# Patient Record
Sex: FEMALE | Race: WHITE | Employment: OTHER | URBAN - METROPOLITAN AREA
[De-identification: names, ages, dates, MRNs, and addresses within clinical notes are randomized per-mention and may not be internally consistent; named-entity substitution may affect disease eponyms.]

---

## 2017-04-10 PROBLEM — Z71.89 COUNSELING ON SUBSTANCE USE AND ABUSE: Status: ACTIVE | Noted: 2017-04-10

## 2017-04-10 PROBLEM — B00.9 HERPES SIMPLEX: Status: ACTIVE | Noted: 2017-04-10

## 2017-04-10 PROBLEM — Z23 NEED FOR PROPHYLACTIC VACCINATION AND INOCULATION AGAINST INFLUENZA: Status: ACTIVE | Noted: 2017-04-10

## 2017-04-10 PROBLEM — R51 HEADACHE: Status: ACTIVE | Noted: 2017-04-10

## 2017-04-10 PROBLEM — J32.9 CHRONIC SINUSITIS: Status: ACTIVE | Noted: 2017-04-10

## 2017-04-10 PROBLEM — Z76.0 ENCOUNTER FOR ISSUE OF REPEAT PRESCRIPTION: Status: ACTIVE | Noted: 2017-04-10

## 2017-04-10 PROBLEM — L70.8 OTHER ACNE: Status: ACTIVE | Noted: 2017-04-10

## 2017-04-10 PROBLEM — Z00.00 ROUTINE GENERAL MEDICAL EXAMINATION AT A HEALTH CARE FACILITY: Status: ACTIVE | Noted: 2017-04-10

## 2017-06-20 PROBLEM — Z76.0 ENCOUNTER FOR ISSUE OF REPEAT PRESCRIPTION: Status: RESOLVED | Noted: 2017-04-10 | Resolved: 2017-06-20

## 2017-06-20 PROBLEM — Z23 NEED FOR PROPHYLACTIC VACCINATION AND INOCULATION AGAINST INFLUENZA: Status: RESOLVED | Noted: 2017-04-10 | Resolved: 2017-06-20

## 2017-06-20 PROBLEM — Z00.00 ROUTINE GENERAL MEDICAL EXAMINATION AT A HEALTH CARE FACILITY: Status: RESOLVED | Noted: 2017-04-10 | Resolved: 2017-06-20

## 2017-06-20 PROBLEM — Z71.89 COUNSELING ON SUBSTANCE USE AND ABUSE: Status: RESOLVED | Noted: 2017-04-10 | Resolved: 2017-06-20

## 2017-10-17 PROCEDURE — 87101 SKIN FUNGI CULTURE: CPT | Performed by: FAMILY MEDICINE

## 2017-10-17 PROCEDURE — 87077 CULTURE AEROBIC IDENTIFY: CPT | Performed by: FAMILY MEDICINE

## 2021-04-08 PROBLEM — I10 ESSENTIAL HYPERTENSION: Status: ACTIVE | Noted: 2021-04-08

## 2021-04-08 PROBLEM — G43.909 MIGRAINE HEADACHE: Status: ACTIVE | Noted: 2017-04-10

## 2023-05-10 RX ORDER — GABAPENTIN 100 MG/1
100 CAPSULE ORAL DAILY PRN
COMMUNITY
Start: 2023-02-23

## 2023-05-10 RX ORDER — CYCLOBENZAPRINE HCL 10 MG
10 TABLET ORAL EVERY 6 HOURS PRN
COMMUNITY
Start: 2023-02-22

## 2023-05-10 RX ORDER — BUTALBITAL, ACETAMINOPHEN AND CAFFEINE 50; 325; 40 MG/1; MG/1; MG/1
1 TABLET ORAL EVERY 4 HOURS PRN
COMMUNITY

## 2023-05-11 ENCOUNTER — HOSPITAL ENCOUNTER (OUTPATIENT)
Facility: HOSPITAL | Age: 63
Setting detail: HOSPITAL OUTPATIENT SURGERY
Discharge: HOME | End: 2023-05-11
Attending: INTERNAL MEDICINE | Admitting: INTERNAL MEDICINE
Payer: COMMERCIAL

## 2023-05-11 ENCOUNTER — ANESTHESIA EVENT (OUTPATIENT)
Dept: ENDOSCOPY | Facility: HOSPITAL | Age: 63
End: 2023-05-11
Payer: COMMERCIAL

## 2023-05-11 ENCOUNTER — ANESTHESIA (OUTPATIENT)
Dept: ENDOSCOPY | Facility: HOSPITAL | Age: 63
End: 2023-05-11
Payer: COMMERCIAL

## 2023-05-11 VITALS
WEIGHT: 110 LBS | OXYGEN SATURATION: 100 % | TEMPERATURE: 99 F | SYSTOLIC BLOOD PRESSURE: 155 MMHG | HEART RATE: 90 BPM | BODY MASS INDEX: 19.49 KG/M2 | RESPIRATION RATE: 16 BRPM | DIASTOLIC BLOOD PRESSURE: 84 MMHG | HEIGHT: 63 IN

## 2023-05-11 PROCEDURE — 0DB68ZX EXCISION OF STOMACH, VIA NATURAL OR ARTIFICIAL OPENING ENDOSCOPIC, DIAGNOSTIC: ICD-10-PCS | Performed by: INTERNAL MEDICINE

## 2023-05-11 PROCEDURE — 88305 TISSUE EXAM BY PATHOLOGIST: CPT | Performed by: INTERNAL MEDICINE

## 2023-05-11 PROCEDURE — 0DBK8ZX EXCISION OF ASCENDING COLON, VIA NATURAL OR ARTIFICIAL OPENING ENDOSCOPIC, DIAGNOSTIC: ICD-10-PCS | Performed by: INTERNAL MEDICINE

## 2023-05-11 RX ORDER — LIDOCAINE HYDROCHLORIDE 10 MG/ML
INJECTION, SOLUTION EPIDURAL; INFILTRATION; INTRACAUDAL; PERINEURAL AS NEEDED
Status: DISCONTINUED | OUTPATIENT
Start: 2023-05-11 | End: 2023-05-11 | Stop reason: SURG

## 2023-05-11 RX ORDER — SODIUM CHLORIDE, SODIUM LACTATE, POTASSIUM CHLORIDE, CALCIUM CHLORIDE 600; 310; 30; 20 MG/100ML; MG/100ML; MG/100ML; MG/100ML
INJECTION, SOLUTION INTRAVENOUS CONTINUOUS
Status: DISCONTINUED | OUTPATIENT
Start: 2023-05-11 | End: 2023-05-11

## 2023-05-11 RX ADMIN — LIDOCAINE HYDROCHLORIDE 50 MG: 10 INJECTION, SOLUTION EPIDURAL; INFILTRATION; INTRACAUDAL; PERINEURAL at 14:36:00

## 2023-05-11 RX ADMIN — SODIUM CHLORIDE, SODIUM LACTATE, POTASSIUM CHLORIDE, CALCIUM CHLORIDE: 600; 310; 30; 20 INJECTION, SOLUTION INTRAVENOUS at 14:49:00

## 2023-05-11 NOTE — ANESTHESIA POSTPROCEDURE EVALUATION
[facility] JASWINDER Patel Patient Status:  Hospital Outpatient Surgery   Age/Gender 58year old female MRN [id number]   Location [address] [address] Attending Tl No MD   Hosp Day # 0 PCP Faraz Gould MD       Anesthesia Post-op Note    ESOPHAGOGASTRODUODENOSCOPY (EGD) with biopsies, and  COLONOSCOPY with cold snare polypectomy    Procedure Summary     Date: 05/11/23 Room / Location: [facility] ENDOSCOPY 03 / [facility] ENDOSCOPY    Anesthesia Start: 3911 Anesthesia Stop:     Procedures:       ESOPHAGOGASTRODUODENOSCOPY (EGD) with biopsies, and COLONOSCOPY with cold snare polypectomy      COLONOSCOPY Diagnosis: (EGD: duodenitis, gastric ulcere, gastritis ; COLON: diverticulosis, colon polyp, hemorrhoids)    Surgeons: Tl No MD Anesthesiologist: Soila Bell MD    Anesthesia Type: MAC ASA Status: 2          Anesthesia Type: MAC    Vitals Value Taken Time   /55 05/11/23 1451   Temp Available 05/11/23 1451   Pulse 110 05/11/23 1451   Resp 16 05/11/23 1451   SpO2 96% 05/11/23 1451       Patient Location: Endoscopy    Anesthesia Type: MAC    Airway Patency: patent    Postop Pain Control: adequate    Mental Status: mildly sedated but able to meaningfully participate in the post-anesthesia evaluation    Nausea/Vomiting: none    Cardiopulmonary/Hydration status: stable euvolemic    Complications: no apparent anesthesia related complications    Postop vital signs: stable    Dental Exam: Unchanged from Preop    Patient to be discharged home when criteria met.

## 2023-05-11 NOTE — OPERATIVE REPORT
OPERATIVE REPORT   PATIENT NAME: Kayla Hines  MRN: [id number]  DATE OF OPERATION: 5/11/2023  PREOPERATIVE DIAGNOSIS: melena, Naproxsyn use, iron deficiency anemia  POSTOPERATIVE DIAGNOSES   1. EGD  1. 2 discrete antral ulcers with black spot consistent with recent bleeding  2. Colonoscopy  1. 8mm sessile AC polyp removed with cold snare  2. Moderate sigmoid and DC diverticulosis  3. Moderate internal hemorrhoids  PROCEDURE PERFORMED:   Esophagogastroduodenoscopy    Colonoscopy to cecum  SCOPE UTILIZED: Pediatric Olympus Colonoscope and upper endoscope  WITHDRAWAL TIME= 6 mins  SEDATION MEDICATIONS: MAC; an anesthesiologist was present to provide deep anesthesia  PREPROCEDURE ASSESSMENT: The indication for this procedure is to assess for ulcers and polyps. The patient was identified by myself and nursing staff in the exam room. Informed consent was obtained. The patient was seen in clinic and a full H&P was obtained. On brief physical examination, airway is patent. Chest is clear. Heart has regular rate and rhythm. Abdomen is soft, nontender with good bowel sounds. A medication list was taken by nursing today and reviewed by myself. The patient is an ASA grade 2. Due to the technical nature of the procedure, pathology of the anal area could be missed. UPPER ENDOSCOPY PROCEDURE NOTE:   The procedure was completed without difficulty. The patient tolerated the procedure well. The endoscope was inserted through the mouth and advanced to the level of the duodenum, 3rd portion. Esophagus appeared normal without stricture or esophagitis. No hiatal hernia or Her's esophagus was noted. Stomach was normal in the antrum and the body except for antral ulcers- no visible vessel was noted. No active bleeding was seen. Duodenum was normal in the bulb and 2nd duodenum. Retroflexed view in the stomach revealed normal fundus and cardia. Gastric biopsies were taken for Helicobacter pylori.  There were no immediate complications. COLONOSCOPY PROCEDURE NOTE:   The procedure was completed without difficulty. The patient tolerated the procedure well. The prep was good. The colonoscope was inserted through the anus and advanced to the level of the cecum with visualization and photo documentation of the appendix. A slow withdrawal of the colonoscope was performed as well as retroflexion in the rectum. Diverticulosis was noted. A 8mm sessile AC polyp was removed with cold snare. No other polyps, masses or lesions were found throughout the colon. Moderate internal hemorrhoids were noted. There were no immediate complications. FINDINGS   1. Ulcers are cause of melena and anemia  2. Colon polyp and diverticulosis  RECOMMENDATIONS: Repeat colonoscopy in 5 years. Omeprazole 20 mg daily since she will chronic need NSAIDS; ibuprofen is better than Aleve or naproxysn; consider celebrex; consider oral iron supplement; PCP to follow up on Hg  DISCHARGE: The patient was given an after visit summary detailing the procedure, findings, recommendations, f/u plan and an updated medication list.   PREP Quality indicators:  Excellent: Colon without any residual stool other than clear liquid requiring minimal suction  Very good: Colon with minimal residual stool requiring minimal suction  Good: Colon with very thin layer of stool or small particulate matter that could easily be washed off and suctioned. This may require earlier return for colonoscopy within 3-7 yrs depending on the colonoscopy findings and family history. Fair: Colon with thick layer of stool or particulate matter that impaired proper visualization of the mucosa. This would require earlier return for colonoscopy within 3 yrs. Poor: Colon with solid particulate matter that impaired proper visualization of the mucosa. This would require an earlier return for colonoscopy within 1 yr. Thank you very much for the consultation. I really appreciate it.     Rafael Estrella MD

## (undated) DEVICE — SNARE 9MM 230CM 2.4MM EXACTO

## (undated) DEVICE — 3M™ RED DOT™ MONITORING ELECTRODE WITH FOAM TAPE AND STICKY GEL, 50/BAG, 20/CASE, 72/PLT 2570: Brand: RED DOT™

## (undated) DEVICE — 10FT COMBINED O2 DELIVERY/CO2 MONITORING. FILTER WITH MICROSTREAM TYPE LUER: Brand: DUAL ADULT NASAL CANNULA

## (undated) DEVICE — FORCEP RADIAL JAW 4

## (undated) DEVICE — TRAP SPEC REMOVAL ETRAP 15CM

## (undated) DEVICE — 1200CC GUARDIAN II: Brand: GUARDIAN

## (undated) DEVICE — BLOCK BITE MAXI 60FR

## (undated) DEVICE — BIOGUARD CLEANING ADAPTER

## (undated) DEVICE — GLOVE SURG SENSICARE SZ 7

## (undated) DEVICE — KIT ENDO ORCAPOD 160/180/190

## (undated) DEVICE — ENDOSCOPY PACK - LOWER: Brand: MEDLINE INDUSTRIES, INC.